# Patient Record
Sex: FEMALE | Race: BLACK OR AFRICAN AMERICAN | NOT HISPANIC OR LATINO | URBAN - METROPOLITAN AREA
[De-identification: names, ages, dates, MRNs, and addresses within clinical notes are randomized per-mention and may not be internally consistent; named-entity substitution may affect disease eponyms.]

---

## 2024-01-29 ENCOUNTER — APPOINTMENT (OUTPATIENT)
Dept: PRIMARY CARE | Facility: CLINIC | Age: 19
End: 2024-01-29

## 2024-12-18 ENCOUNTER — OFFICE VISIT (OUTPATIENT)
Dept: URGENT CARE | Age: 19
End: 2024-12-18
Payer: MEDICAID

## 2024-12-18 VITALS
SYSTOLIC BLOOD PRESSURE: 113 MMHG | TEMPERATURE: 98.6 F | DIASTOLIC BLOOD PRESSURE: 70 MMHG | HEART RATE: 54 BPM | RESPIRATION RATE: 18 BRPM | OXYGEN SATURATION: 95 %

## 2024-12-18 DIAGNOSIS — N76.0 VULVOVAGINITIS: ICD-10-CM

## 2024-12-18 DIAGNOSIS — Z72.51 UNPROTECTED SEX: ICD-10-CM

## 2024-12-18 DIAGNOSIS — N76.0 BACTERIAL VAGINOSIS: ICD-10-CM

## 2024-12-18 DIAGNOSIS — B96.89 BACTERIAL VAGINOSIS: ICD-10-CM

## 2024-12-18 LAB
CHLAMYDIA TRACHOMATIS: NOT DETECTED
NEISSERIA GONORRHOEAE: NOT DETECTED
POC BACTERIAL VAGINITIS (RAPID): POSITIVE
POC BILIRUBIN, URINE: NEGATIVE
POC BLOOD, URINE: NEGATIVE
POC GLUCOSE, URINE: NEGATIVE MG/DL
POC KETONES, URINE: ABNORMAL MG/DL
POC LEUKOCYTES, URINE: ABNORMAL
POC NITRITE,URINE: NEGATIVE
POC PH, URINE: 6 PH
POC PROTEIN, URINE: NEGATIVE MG/DL
POC SPECIFIC GRAVITY, URINE: 1.02
POC UROBILINOGEN, URINE: 0.2 EU/DL
PREGNANCY TEST URINE, POC: NEGATIVE
TRICHOMONAS VAGINA: NOT DETECTED

## 2024-12-18 PROCEDURE — 1036F TOBACCO NON-USER: CPT | Performed by: STUDENT IN AN ORGANIZED HEALTH CARE EDUCATION/TRAINING PROGRAM

## 2024-12-18 PROCEDURE — 81003 URINALYSIS AUTO W/O SCOPE: CPT | Performed by: STUDENT IN AN ORGANIZED HEALTH CARE EDUCATION/TRAINING PROGRAM

## 2024-12-18 PROCEDURE — 87205 SMEAR GRAM STAIN: CPT

## 2024-12-18 PROCEDURE — 99204 OFFICE O/P NEW MOD 45 MIN: CPT | Performed by: STUDENT IN AN ORGANIZED HEALTH CARE EDUCATION/TRAINING PROGRAM

## 2024-12-18 PROCEDURE — 81025 URINE PREGNANCY TEST: CPT | Performed by: STUDENT IN AN ORGANIZED HEALTH CARE EDUCATION/TRAINING PROGRAM

## 2024-12-18 PROCEDURE — 87905 IA NZMTC ACTV OTH/THN VIRUS: CPT | Performed by: STUDENT IN AN ORGANIZED HEALTH CARE EDUCATION/TRAINING PROGRAM

## 2024-12-18 PROCEDURE — 87801 DETECT AGNT MULT DNA AMPLI: CPT | Performed by: STUDENT IN AN ORGANIZED HEALTH CARE EDUCATION/TRAINING PROGRAM

## 2024-12-18 RX ORDER — METRONIDAZOLE 500 MG/1
500 TABLET ORAL 2 TIMES DAILY
Qty: 14 TABLET | Refills: 0 | Status: SHIPPED | OUTPATIENT
Start: 2024-12-18 | End: 2024-12-25

## 2024-12-18 RX ORDER — FLUCONAZOLE 150 MG/1
TABLET ORAL
Qty: 2 TABLET | Refills: 0 | Status: SHIPPED | OUTPATIENT
Start: 2024-12-18

## 2024-12-18 NOTE — PROGRESS NOTES
Subjective   Patient ID: Anita Baker is a 19 y.o. female. They present today with a chief complaint of Vaginal discomfort (Itchy and swollen for 1 day. ), Vaginal Discharge (White discharge and odor), and Exposure to STD (Possible exposure. 4 days ago. Request vaginal exam).    History of Present Illness  Patient reports ~1 day of vaginal discharge  Reports vulvovaginal swelling  Notes that it is thin  Reports foul odor  Monogamous with boyfriend of 3 years  Denies concern for STI, willing to test  Denies pelvic pain  Denies nausea, vomiting  Denies fever  Denies abdominal pain  Came off her period ~3-4 days ago  Denies use of BC    Past Medical History  Allergies as of 12/18/2024    (No Known Allergies)       (Not in a hospital admission)       Past Medical History:   Diagnosis Date    25 weeks gestation of pregnancy ([facility])     25 weeks gestation of pregnancy    Other specified health status 02/16/2022    No pertinent past medical history       Past Surgical History:   Procedure Laterality Date    OTHER SURGICAL HISTORY  02/16/2022    No history of surgery        reports that she has never smoked. She has never used smokeless tobacco. She reports current drug use. Drug: Marijuana. She reports that she does not drink alcohol.                               Objective    Vitals:    12/18/24 1629   BP: 113/70   Pulse: 54   Resp: 18   Temp: 37 °C (98.6 °F)   SpO2: 95%     Patient's last menstrual period was 12/15/2024 (approximate).    Physical Exam  Exam conducted with a chaperone present (Yesy OTTO).   Constitutional:       General: She is not in acute distress.     Appearance: Normal appearance. She is not toxic-appearing or diaphoretic.   HENT:      Nose: No rhinorrhea.   Eyes:      General: No scleral icterus.        Right eye: No discharge.         Left eye: No discharge.      Extraocular Movements: Extraocular movements intact.   Pulmonary:      Effort: Pulmonary effort is normal.   Genitourinary:      Vagina: No foreign body. Vaginal discharge present. No bleeding.      Comments: Vaginal opening swollen  Pain with speculum insertion w/lubricant  Thick white discharge scattered throughout vagina  Unable to visualize cervix  Musculoskeletal:      Cervical back: Normal range of motion.   Neurological:      Mental Status: She is alert.         Procedures    Point of Care Test & Imaging Results from this visit:      Diagnostic study results (if any) were reviewed by Barry Meek MD.    Assessment/Plan   Allergies, medications, history, and pertinent labs/EKGs/Imaging reviewed by Barry Meek MD.     Medical Decision Making:    Patient is BV positive. Encouragingly, visby GC/CT/Trich is negative. UA small leuks possibly from vaginitis, no UTI like symptoms. Speculum exam is concerning for candidiasis as well. Order fluconazole. Will send out vaginitis gram stain. Return as needed.     Orders and Diagnoses  Diagnoses and all orders for this visit:  Vulvovaginitis  -     POCT pregnancy, urine manually resulted  -     POCT UA Automated manually resulted  -     POCT BV Blue Rapid - Bacterial Vaginitis manually resulted  -     metroNIDAZOLE (Flagyl) 500 mg tablet; Take 1 tablet (500 mg) by mouth 2 times a day for 7 days.  -     fluconazole (Diflucan) 150 mg tablet; Take 1 tablet by mouth. If symptoms continue to persist 72 hours (3 days) later you may take the remaining tablet.  -     Vaginitis Gram Stain For Bacterial Vaginosis + Yeast  Bacterial vaginosis  -     POCT pregnancy, urine manually resulted  -     POCT UA Automated manually resulted  -     POCT BV Blue Rapid - Bacterial Vaginitis manually resulted  -     POCT STI PCR (GC,TRICH) manually resulted  -     metroNIDAZOLE (Flagyl) 500 mg tablet; Take 1 tablet (500 mg) by mouth 2 times a day for 7 days.  -     fluconazole (Diflucan) 150 mg tablet; Take 1 tablet by mouth. If symptoms continue to persist 72 hours (3 days) later you may take the remaining  tablet.  -     Vaginitis Gram Stain For Bacterial Vaginosis + Yeast  Unprotected sex  -     POCT pregnancy, urine manually resulted  -     POCT UA Automated manually resulted  -     POCT BV Blue Rapid - Bacterial Vaginitis manually resulted  -     POCT STI PCR (GC,TRICH) manually resulted      Patient disposition: Home      Medical Admin Record      Follow Up Instructions  No follow-ups on file.    Electronically signed by Barry Meek MD  10:22 PM

## 2024-12-19 LAB
CLUE CELLS VAG LPF-#/AREA: PRESENT /[LPF]
NUGENT SCORE: 9
YEAST VAG WET PREP-#/AREA: PRESENT